# Patient Record
Sex: MALE | Race: WHITE | Employment: OTHER | URBAN - METROPOLITAN AREA
[De-identification: names, ages, dates, MRNs, and addresses within clinical notes are randomized per-mention and may not be internally consistent; named-entity substitution may affect disease eponyms.]

---

## 2019-09-17 ENCOUNTER — OFFICE VISIT (OUTPATIENT)
Dept: INTERNAL MEDICINE | Facility: CLINIC | Age: 34
End: 2019-09-17

## 2019-09-17 VITALS
DIASTOLIC BLOOD PRESSURE: 77 MMHG | SYSTOLIC BLOOD PRESSURE: 102 MMHG | BODY MASS INDEX: 24.91 KG/M2 | OXYGEN SATURATION: 94 % | TEMPERATURE: 98.2 F | WEIGHT: 194.1 LBS | HEART RATE: 98 BPM | HEIGHT: 74 IN

## 2019-09-17 DIAGNOSIS — M25.562 PAIN IN BOTH KNEES, UNSPECIFIED CHRONICITY: Primary | ICD-10-CM

## 2019-09-17 DIAGNOSIS — M25.50 MULTIPLE JOINT PAIN: ICD-10-CM

## 2019-09-17 DIAGNOSIS — M25.561 PAIN IN BOTH KNEES, UNSPECIFIED CHRONICITY: Primary | ICD-10-CM

## 2019-09-17 DIAGNOSIS — M25.552 LATERAL PAIN OF LEFT HIP: ICD-10-CM

## 2019-09-17 LAB
CRP SERPL-MCNC: 4.7 MG/L (ref 0–8)
ERYTHROCYTE [SEDIMENTATION RATE] IN BLOOD BY WESTERGREN METHOD: 4 MM/H (ref 0–15)

## 2019-09-17 PROCEDURE — 99000 SPECIMEN HANDLING OFFICE-LAB: CPT | Performed by: INTERNAL MEDICINE

## 2019-09-17 PROCEDURE — 86618 LYME DISEASE ANTIBODY: CPT | Performed by: INTERNAL MEDICINE

## 2019-09-17 PROCEDURE — 85652 RBC SED RATE AUTOMATED: CPT | Performed by: INTERNAL MEDICINE

## 2019-09-17 PROCEDURE — 86431 RHEUMATOID FACTOR QUANT: CPT | Performed by: INTERNAL MEDICINE

## 2019-09-17 PROCEDURE — 99214 OFFICE O/P EST MOD 30 MIN: CPT | Performed by: INTERNAL MEDICINE

## 2019-09-17 PROCEDURE — 36415 COLL VENOUS BLD VENIPUNCTURE: CPT | Performed by: INTERNAL MEDICINE

## 2019-09-17 PROCEDURE — 86747 PARVOVIRUS ANTIBODY: CPT | Mod: 90 | Performed by: INTERNAL MEDICINE

## 2019-09-17 PROCEDURE — 86140 C-REACTIVE PROTEIN: CPT | Performed by: INTERNAL MEDICINE

## 2019-09-17 ASSESSMENT — MIFFLIN-ST. JEOR: SCORE: 1884.15

## 2019-09-17 NOTE — LETTER
September 23, 2019      Alf Dugan  [address] [facility] [address]        Dear ,    The sed rate and CRP your markers of inflammation in the or in the normal range.  Lyme disease test was negative.  The rheumatoid arthritis test is negative.  Parvovirus shows that you have had this infection in the past which is very common but it is not been any recent infection s your symptoms are most likely related to osteoarthritis.  If there are significant changes let me know.  You may try glucosamine/chondroitin which is a supplement that might help preserve and protect the cartilage in your joints.  This is available over-the-counter and is very safe.    Resulted Orders   Rheumatoid factor   Result Value Ref Range    Rheumatoid Factor <20 <20 IU/mL   ESR: Erythrocyte sedimentation rate   Result Value Ref Range    Sed Rate 4 0 - 15 mm/h   CRP, inflammation   Result Value Ref Range    CRP Inflammation 4.7 0.0 - 8.0 mg/L   Lyme Disease Kristen with reflex to WB Serum   Result Value Ref Range    Lyme Disease Antibodies Serum 0.06 0.00 - 0.89      Comment:      Negative, Absence of detectable Borrelia burdorferi antibodies. A negative   result does not exclude the possibility of Borrelia burgdorferi infection. If   early Lyme disease is suspected, a second sample should be collected and   tested 2 to 4 weeks later.     Parvovirus B19 antibodies IgG IgM   Result Value Ref Range    Parvovirus B19 IgG 2.74 (H) <=0.89 IV      Comment:      (Note)  INTERPRETIVE INFORMATION: Parvovirus B19 Antibody, IgG   0.89 IV or less .......... Negative - No significant                              level of detectable Parvovirus                              B19 IgG antibody.   0.90 - 1.10 IV ........... Equivocal - Repeat testing in                              10-14 days may be helpful.   1.11 IV or greater ....... Positive - IgG antibody to                              Parvovirus B19 detected which                               may indicate a current or                              past infection.  The best evidence for current infection is a significant   change on two appropriately timed specimens, where both   tests are done in the same laboratory at the same time.      Parvovirus B19 IgM 0.09 <=0.89 IV      Comment:      (Note)  INTERPRETIVE INFORMATION: Parvovirus B19 Antibody, IgM   0.89 IV or less .......... Negative - No significant                              level of detectable Parvovirus                              B19 IgM antibody.   0.90 - 1.10 IV ........... Equivocal - Repeat testing in                              10-14 days may be helpful.   1.11 IV or greater ........ Positive - IgM antibody to                              Parvovirus B19 detected which                              may indicate a current or                              recent infection. However, low                              levels of IgM antibodies may                              occasionally persist for more                              than 12 months post-infection.  The best evidence for current infection is a significant   change on two appropriately timed specimens, where both   tests are done in the same laboratory at the same time.  Appearance of an IgM antibody response normally occurs 7 to   14 days after t  he onset of disease. Testing immediately   post-exposure is of no value without a later convalescent   specimen. A residual IgM response may be distinguished from   early IgM response to infection by testing sera from   patients three to four weeks later for changing levels of   specific IgM antibodies.  Performed by [facility],  [address] [place] [id number] [phone]  [email or web address], Maxime Nicole MD, Lab. Director         If you have any questions or concerns, please call the clinic at the number listed above.       Sincerely,        Rose Littlejohn MD

## 2019-09-17 NOTE — PROGRESS NOTES
Subjective     Alf Dugan is a 34 year old male who presents to clinic today for the following health issues:    HPI     Multiple joint pains:  He reports he has had trouble over 5 months that seem to start first in the hands, then a month later started to bother his knees, month after that his hips.  He states that his wife had noticed that he seemed to be more angry and he felt that was due to pain so decided to come in.    Hand joints: He reports that he has minimal pain in the morning but then it does increase with use, most days the pain starts at 1/10, goes up to 3/10 but some days are 5/10.  The pain is in the MCPs and PIP joints.he feels like there is pressure or like they are swollen though they do not look obviously swollen to the eye.  They hurt more with more use, they feel very stiff in the stiffness comes on through the day without significant morning stiffness.  They bother more with cold weather, humidity weather.  It was bilateral equal the first few months, now it can vary but currently the left MCPs are the most sore.   He does not have any pain in the wrists or elbows.  He has had some issues with the left shoulder on and off in the past.    Knee joints: They have not been swollen, it is primarily the medial joints that hurt, stairs will bother going up more than going down, that they do pop occasionally with no locking.  They can cause him to limp when they are bothering him.  They are about bilaterally equal.  They tend to be very stiff all the time.    Hips: This is more lateral hips, occasionally in the groin.  It is bothering mostly on the left.  It seems okay in the morning and then hurts more as he is more active and later in the day.  He can affect the walking.  It can bother him with climbing a lot of stairs.  He has some occasional low back pain in the center with some stiffness but no true radiation into the legs below the knees, no buttock pain, no numbness or tingling in the  "legs.    He rarely takes anything for his symptoms.    Patient Active Problem List   Diagnosis     CARDIOVASCULAR SCREENING; LDL GOAL LESS THAN 160     No current outpatient medications on file.          Reviewed and updated as needed this visit by Provider         Review of Systems   No fever, chills, rashes, joint swellings, tick bites, numbness, tingling in the arms or legs      Objective    /77 (BP Location: Left arm, Patient Position: Sitting, Cuff Size: Adult Large)   Pulse 98   Temp 98.2  F (36.8  C) (Oral)   Ht 1.886 m (6' 2.25\")   Wt 88 kg (194 lb 1.6 oz)   SpO2 94%   BMI 24.75 kg/m    Body mass index is 24.75 kg/m .  Physical Exam     Hands: There is some tenderness with some of the MCP joints, particularly left third, PIP joints without significant tenderness, no synovitis or effusions  Wrist joints nontender, good range of motion without pain, no effusions, synovitis  Elbows are nontender, no effusion, synovitis  Knee joints are moderately tender at the medial joints, no effusions, no crepitus, full range of motion  There is moderate tenderness of the left lateral hip area, mild tenderness in the right, joints are without tenderness, good range of motion without pain,  Mild tenderness of the left buttock  Mild tenderness of the lower lumbar back, good range of motion          Assessment & Plan     1. Pain in both knees, unspecified chronicity  Advised that this may be more of the PDX  - ORTHO  REFERRAL    2. Lateral pain of left hip  This may be related to the knees, probably some bursitis, refer to orthopedics  - ORTHO  REFERRAL    3. Multiple joint pain  Suspect the hand joints are more of an osteoarthritis problem, check some labs to look for systemic issues, advised on hand care, over-the-counter treatment  - Rheumatoid factor  - ESR: Erythrocyte sedimentation rate  - CRP, inflammation  - Lyme Disease Kristen with reflex to WB Serum  - Parvovirus B19 antibodies IgG IgM   "   Tobacco Cessation:   reports that he has been smoking cigarettes. He has a 3.00 pack-year smoking history. He has never used smokeless tobacco.  Tobacco Cessation Action Plan: Information offered: Patient not interested at this time            Return in about 1 year (around 9/17/2020) for Physical Exam.    Rose Littlejohn MD  [facility]

## 2019-09-18 LAB
B BURGDOR IGG+IGM SER QL: 0.06 (ref 0–0.89)
RHEUMATOID FACT SER NEPH-ACNC: <20 IU/ML (ref 0–20)

## 2019-09-19 LAB
B19V IGG SER IA-ACNC: 2.74 IV
B19V IGM SER IA-ACNC: 0.09 IV

## 2019-10-02 ENCOUNTER — OFFICE VISIT (OUTPATIENT)
Dept: ORTHOPEDICS | Facility: CLINIC | Age: 34
End: 2019-10-02

## 2019-10-02 VITALS
WEIGHT: 194 LBS | DIASTOLIC BLOOD PRESSURE: 62 MMHG | SYSTOLIC BLOOD PRESSURE: 124 MMHG | HEIGHT: 74 IN | BODY MASS INDEX: 24.9 KG/M2

## 2019-10-02 DIAGNOSIS — M22.2X2 PATELLOFEMORAL SYNDROME OF BOTH KNEES: Primary | ICD-10-CM

## 2019-10-02 DIAGNOSIS — M22.2X1 PATELLOFEMORAL SYNDROME OF BOTH KNEES: Primary | ICD-10-CM

## 2019-10-02 PROCEDURE — 99203 OFFICE O/P NEW LOW 30 MIN: CPT | Performed by: FAMILY MEDICINE

## 2019-10-02 RX ORDER — MELOXICAM 15 MG/1
15 TABLET ORAL DAILY
Qty: 30 TABLET | Refills: 2 | Status: SHIPPED | OUTPATIENT
Start: 2019-10-02 | End: 2023-03-22

## 2019-10-02 ASSESSMENT — MIFFLIN-ST. JEOR: SCORE: 1883.7

## 2019-10-02 NOTE — PATIENT INSTRUCTIONS
1. Patellofemoral syndrome of both knees      -Patient has bilateral chronic knee pain due to patellofemoral syndrome.  -Patient will start meloxicam 15 mg daily as needed  -Patient will start home exercise program which was given to the patient today  -Patient may continue to work and perform all activities as tolerated.  -If pain does not improve, to consider a follow-up appointment for a possible cortisone injection.  -Patient may purchase over-the-counter patellofemoral stabilizing braces  -Call direct clinic number [[phone]] at any time with questions or concerns.    Albert Yeo MD CAQSM  [facility]  [facility] [place]
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
## 2019-10-04 ENCOUNTER — TELEPHONE (OUTPATIENT)
Dept: ORTHOPEDICS | Facility: CLINIC | Age: 34
End: 2019-10-04

## 2019-10-04 NOTE — TELEPHONE ENCOUNTER
Alf Dugan is a 34 year old male who left voicemail yesterday stating he saw Dr. Yeo on 10/2/19 and was prescribed Meloxicam 15mg. When he went to the pharmacy to pick it up, they did not have a prescription.     Patient expecting call back: YES      Preferred contact number:  [phone] (home) [phone] (work)   Can we leave a detailed message on this number: NO consent on file    Per chart review prescription was sent on 10/2/19 at 4:50 pm and receipt was confirmed by pharmacy in [facility].     Phone call to [facility] and they did receive prescription. However, they need insurance information from patient. He states it is not a matter of it being denied by insurance, but that they need patients insurance information in or to process it.     Phone call to patient. He states he checked at the pharmacy yesterday afternoon and was told they did not have it. Informed of the above and asked that he contact pharmacy. Asked that he call back if any problems. He verbalized understanding.     LAURENT Yoder RN

## 2021-11-23 ENCOUNTER — E-VISIT (OUTPATIENT)
Dept: FAMILY MEDICINE | Facility: CLINIC | Age: 36
End: 2021-11-23
Payer: COMMERCIAL

## 2021-11-23 ENCOUNTER — LAB (OUTPATIENT)
Dept: LAB | Facility: CLINIC | Age: 36
End: 2021-11-23
Attending: PHYSICIAN ASSISTANT

## 2021-11-23 DIAGNOSIS — Z20.822 CLOSE EXPOSURE TO 2019 NOVEL CORONAVIRUS: ICD-10-CM

## 2021-11-23 DIAGNOSIS — Z20.822 CLOSE EXPOSURE TO 2019 NOVEL CORONAVIRUS: Primary | ICD-10-CM

## 2021-11-23 LAB — SARS-COV-2 RNA RESP QL NAA+PROBE: NEGATIVE

## 2021-11-23 PROCEDURE — 99421 OL DIG E/M SVC 5-10 MIN: CPT | Performed by: PHYSICIAN ASSISTANT

## 2021-11-23 PROCEDURE — U0003 INFECTIOUS AGENT DETECTION BY NUCLEIC ACID (DNA OR RNA); SEVERE ACUTE RESPIRATORY SYNDROME CORONAVIRUS 2 (SARS-COV-2) (CORONAVIRUS DISEASE [COVID-19]), AMPLIFIED PROBE TECHNIQUE, MAKING USE OF HIGH THROUGHPUT TECHNOLOGIES AS DESCRIBED BY CMS-2020-01-R: HCPCS

## 2021-11-23 PROCEDURE — U0005 INFEC AGEN DETEC AMPLI PROBE: HCPCS

## 2021-11-23 NOTE — PATIENT INSTRUCTIONS
"  Dear Alf Dugan,    Based on your exposure to COVID-19 (coronavirus), we would like to test you for this virus. I have placed an order for this test. The best time for testing is 5-7 days after the exposure.    How to schedule:  For all employees or close contacts (except [facility] and [employer] - see below), go to your [employer] home page and scroll down to the section that says  You have an appointment that needs to be scheduled  and click the large green button that says  Schedule Now  and follow the steps to find the next available opening.     If you are unable to complete these steps or if you cannot find any available times, please call [phone] to schedule employee testing.     [facility] employees or close contacts, please call [phone].   [facility]) employees or close contacts call [phone].    Return to work/school/ guidance:   For people with high risk exposures outside the home    Please let your workplace manager and staffing office know when your quarantine ends.     We can not give you an exact date as it depends on the information below. You can calculate this on your own or work with your manager/staffing office to calculate this. (For example if you were exposed on 10/4, you would have to quarantine for 14 full days. That would be through 10/18. You could return on 10/19.)    Quarantine Guidelines:  Patients (\"contacts\") who have been in close prolonged contact of an infected person(s) (within six feet for at least 15 minutes within a 24 hour period), and remain asymptomatic should enter quarantine based on the following options:    14-day quarantine period (this remains the CDC recommendation for the greatest protection against spread of COVID-19) OR    Minimum 7-day quarantine with negative RT-PCR test collected on day 5 or later OR    10-day quarantine with no test  Quarantine Guideline exceptions are as follows:    People who have been fully vaccinated do not need to " quarantine if the exposure was at least 2 weeks after the last vaccination. This includes vaccinated health care workers.    Not fully vaccinated and unvaccinated Individuals who work in health care, congregate care, or congregate living should be off work for 14 days from their last date of exposure. Community activities for this group can be resumed based on options above. Fully vaccinated individuals in this group do not need to quarantine from work after exposure.    Not fully vaccinated and unvaccinated people whose high-risk exposure was a household member should always quarantine for 14 days from their last date of exposure. Fully vaccinated people in this category do not need to quarantine.    Not fully vaccinated or unvaccinated residents of congregate care and congregate living settings should always quarantine for 14 days from their last date of exposure. Fully vaccinated residents do not need to quarantine.    Note: If you have ongoing exposure to the covid positive person, this quarantine period may be more than 14 days. (For example, if you are continued to be exposed to your child who tested positive and cannot isolate from them, then the quarantine of 7-14 days can't start until your child is no longer contagious. This is typically 10 days from onset of the child's symptoms. So the total duration may be 17-24 days in this case.)    You should continue symptom monitoring until day 14 post-exposure. If you develop signs or symptoms of COVID-19, isolate and get tested (even if you have been tested already).    How to quarantine:   Stay home and away from others. Don't go to school or anywhere else. Generally quarantine means staying home from work but there are some exceptions to this. Please contact your workplace.  No hugging, kissing or shaking hands.  Don't let anyone visit.  Cover your mouth and nose with a mask, tissue or washcloth to avoid spreading germs.  Wash your hands and face often. Use  soap and water.    What are the symptoms of COVID-19?  The most common symptoms are cough, fever and trouble breathing. Less common symptoms include headache, body aches, fatigue (feeling very tired), chills, sore throat, stuffy or runny nose, diarrhea (loose poop), loss of taste or smell, belly pain, and nausea or vomiting (feeling sick to your stomach or throwing up).  After 14 days, if you have still don't have symptoms, you likely don't have this virus.  If you develop symptoms, follow these guidelines.  If you're normally healthy: Please start another eVisit.  If you have a serious health problem (like cancer, heart failure, an organ transplant or kidney disease): Call your specialty clinic. Let them know that you might have COVID-19.    Where can I get more information?   [employer] - About COVID-19: [email or web address]  CDC - What to Do If You're Sick: www.cdc.gov/coronavirus/2019-ncov/about/steps-when-sick.html  CDC - Ending Home Isolation: www.cdc.gov/coronavirus/2019-ncov/hcp/disposition-in-home-patients.html  CDC - Caring for Someone: www.cdc.gov/coronavirus/2019-ncov/if-you-are-sick/care-for-someone.html  [facility] clinical trials (COVID-19 research studies): clinicalaffairs.[facility]-clinical-trials  Below are the COVID-19 hotlines at the [facility] ([facility]). Interpreters are available.  For health questions: Call 736-007-8664 or 1-920.394.6250 (7 a.m. to 7 p.m.)  For questions about schools and childcare: Call 802-888-4969 or 1-442.694.8831 (7 a.m. to 7 p.m.)      November 23, 2021  RE:  Alf BARRAGAN Kailee                                                                                                                   [address] [facility] [address]      To whom it may concern:    I evaluated Alf Dugan on November 23, 2021. Alf Dugan should be excused from work/school.    They should let their workplace manager and staffing office  "know when their quarantine ends.    We can not give an exact date as it depends on the information below. They can calculate this on their own or work with their manager/staffing office to calculate this. (For example if they were exposed on 10/04, they would have to quarantine for 14 full days. That would be through 10/18. They could return on 10/19.)    Quarantine Guidelines:    Patients (\"contacts\") who have been in close prolonged contact of an infected person(s) (within six feet for at least 15 minutes within a 24 hour period) and remain asymptomatic should enter quarantine based on the following options:      14-day quarantine period (this remains the CDC recommendation for the greatest protection against spread of COVID-19) OR    Minimum 7-day quarantine with negative RT-PCR test collected on day 5 or later OR    10-day quarantine with no test   Quarantine Guideline exceptions are as follows:    People who have been fully vaccinated do not need to quarantine if the exposure was at least 2 weeks after the last vaccination. This includes vaccinated health care workers.    Not fully vaccinated and unvaccinated Individuals who work in health care, congregate care, or congregate living should be off work for 14 days from their last date of exposure. Community activities for this group can be resumed based on options above. Fully vaccinated individuals in this group do not need to quarantine from work after exposure.    Not fully vaccinated and unvaccinated people whose high-risk exposure was a household member should always quarantine for 14 days from their last date of exposure. Fully vaccinated people in this category do not need to quarantine.    Not fully vaccinated or unvaccinated residents of congregate care and congregate living settings should always quarantine for 14 days from their last date of exposure. Fully vaccinated residents do not need to quarantine.    Note: If there is ongoing exposure to the " covid positive person, this quarantine period may be longer than 14 days. (For example, if they are continually exposed to their child, who tested positive and cannot isolate from them, then the quarantine of 7-14 days can't start until their child is no longer contagious. This is typically 10 days from onset to the child's symptoms. So the total duration may be 17-24 days in this case.)    Alf YUNG Robertsle should continue symptom monitoring until day 14 post-exposure. If they develop signs or symptoms of COVID-19, they should isolate and get tested (even if they have been tested already).    Sincerely,  Nuzhat Cali PA-C

## 2021-12-18 ENCOUNTER — HEALTH MAINTENANCE LETTER (OUTPATIENT)
Age: 36
End: 2021-12-18

## 2022-02-01 ENCOUNTER — IMMUNIZATION (OUTPATIENT)
Dept: NURSING | Facility: CLINIC | Age: 37
End: 2022-02-01
Payer: COMMERCIAL

## 2022-02-01 PROCEDURE — 91306 COVID-19,PF,MODERNA (18+ YRS BOOSTER .25ML): CPT

## 2022-02-01 PROCEDURE — 0064A COVID-19,PF,MODERNA (18+ YRS BOOSTER .25ML): CPT

## 2022-02-17 ENCOUNTER — OFFICE VISIT (OUTPATIENT)
Dept: INTERNAL MEDICINE | Facility: CLINIC | Age: 37
End: 2022-02-17
Payer: COMMERCIAL

## 2022-02-17 VITALS
OXYGEN SATURATION: 98 % | WEIGHT: 159.6 LBS | BODY MASS INDEX: 20.35 KG/M2 | HEART RATE: 72 BPM | TEMPERATURE: 97.8 F | DIASTOLIC BLOOD PRESSURE: 78 MMHG | SYSTOLIC BLOOD PRESSURE: 117 MMHG | RESPIRATION RATE: 18 BRPM

## 2022-02-17 DIAGNOSIS — L84 CALLUS OF FOOT: Primary | ICD-10-CM

## 2022-02-17 DIAGNOSIS — R20.0 HAND NUMBNESS: ICD-10-CM

## 2022-02-17 PROCEDURE — 99213 OFFICE O/P EST LOW 20 MIN: CPT | Performed by: INTERNAL MEDICINE

## 2022-02-17 RX ORDER — PHENOL 1.4 %
10 AEROSOL, SPRAY (ML) MUCOUS MEMBRANE
COMMUNITY

## 2022-02-17 ASSESSMENT — PATIENT HEALTH QUESTIONNAIRE - PHQ9
SUM OF ALL RESPONSES TO PHQ QUESTIONS 1-9: 9
SUM OF ALL RESPONSES TO PHQ QUESTIONS 1-9: 9
10. IF YOU CHECKED OFF ANY PROBLEMS, HOW DIFFICULT HAVE THESE PROBLEMS MADE IT FOR YOU TO DO YOUR WORK, TAKE CARE OF THINGS AT HOME, OR GET ALONG WITH OTHER PEOPLE: NOT DIFFICULT AT ALL

## 2022-02-17 NOTE — NURSING NOTE
/78 (BP Location: Right arm, Patient Position: Sitting, Cuff Size: Adult Large)   Pulse 72   Temp 97.8  F (36.6  C) (Oral)   Resp 18   Wt 72.4 kg (159 lb 9.6 oz)   SpO2 98%   BMI 20.35 kg/m

## 2022-02-17 NOTE — PROGRESS NOTES
Assessment & Plan     Callus of foot  Advised that the callusing is related to more pressure on the lateral foot.  Suggest he get insoles that will help some excess supination.  Discussed use of over-the-counter products, pumice to keep the callusing reduced.  Could use bunion pad at the lateral MTPs though there is not really significant deformity at this point.  If he is trying this thinks and it is not improved, could refer to podiatry to consider fitted insoles.    Hand numbness  Advised that this may not be related to the shoulder though cannot completely rule that out.  It is possible it is carpal tunnel, advised about the location of the changes related to brachial plexus versus median nerve.  If he can tell that it seems to be carpal tunnel, he can wear splints.  If progressive, could consider referral.        No follow-ups on file.    Rose Littlejohn MD  [facility] PATSY Milner is a 36 year old who presents for the following health issues     1.  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  He has developed callusing and pain at the lateral feet.  This has become gradually thicker, especially near the fifth MTP.  This is causing some pain, especially by the end of the day.    2.  Left hand numbness: He had previously dislocated the left shoulder, he feels some soreness there occasionally.  Now he is noticing that he will get some occasional numbness in his left hand.  This occurs about once a week, feels like the entire hand, not sure if it is lateral or medial.      History of Present Illness     Reason for visit:  Foot problems  Symptom onset:  3-4 weeks ago  Symptoms include:  Callous buildup on inside edges of feet and possible bunions  Symptom intensity:  Moderate  Symptom progression:  Worsening  Had these symptoms before:  No    He eats 2-3 servings of fruits and vegetables daily.He consumes 6 sweetened beverage(s) daily.He  exercises with enough effort to increase his heart rate 60 or more minutes per day.  He exercises with enough effort to increase his heart rate 6 days per week.   He is taking medications regularly.       Patient Active Problem List   Diagnosis     CARDIOVASCULAR SCREENING; LDL GOAL LESS THAN 160     Current Outpatient Medications   Medication Sig Dispense Refill     Melatonin 10 MG TABS tablet Take 10 mg by mouth nightly as needed for sleep       meloxicam (MOBIC) 15 MG tablet Take 1 tablet (15 mg) by mouth daily 30 tablet 2      Review of Systems   negative      Objective    /78 (BP Location: Right arm, Patient Position: Sitting, Cuff Size: Adult Large)   Pulse 72   Temp 97.8  F (36.6  C) (Oral)   Resp 18   Wt 72.4 kg (159 lb 9.6 oz)   SpO2 98%   BMI 20.35 kg/m    Body mass index is 20.35 kg/m .  Physical Exam     Left hand: Currently there are no paresthesias or dysesthesias.  Negative Tinel's at the wrist    Feet: There is some thickened callus lateral feet, especially at the fifth MTPs.  Mild tenderness at that joint.  Not really significant deformity at this point.

## 2022-02-18 ASSESSMENT — PATIENT HEALTH QUESTIONNAIRE - PHQ9: SUM OF ALL RESPONSES TO PHQ QUESTIONS 1-9: 9

## 2022-07-15 ENCOUNTER — OFFICE VISIT (OUTPATIENT)
Dept: PODIATRY | Facility: CLINIC | Age: 37
End: 2022-07-15
Payer: COMMERCIAL

## 2022-07-15 VITALS — WEIGHT: 159 LBS | SYSTOLIC BLOOD PRESSURE: 100 MMHG | BODY MASS INDEX: 20.28 KG/M2 | DIASTOLIC BLOOD PRESSURE: 60 MMHG

## 2022-07-15 DIAGNOSIS — Q66.70 PES CAVUS: ICD-10-CM

## 2022-07-15 DIAGNOSIS — B35.1 ONYCHOMYCOSIS OF TOENAIL: ICD-10-CM

## 2022-07-15 DIAGNOSIS — M20.32 HALLUX HAMMERTOE, LEFT: ICD-10-CM

## 2022-07-15 DIAGNOSIS — M20.31 HALLUX HAMMERTOE, RIGHT: ICD-10-CM

## 2022-07-15 DIAGNOSIS — L84 CALLUS: Primary | ICD-10-CM

## 2022-07-15 PROCEDURE — 99203 OFFICE O/P NEW LOW 30 MIN: CPT | Performed by: PODIATRIST

## 2022-07-15 RX ORDER — CICLOPIROX OLAMINE 7.7 MG/G
CREAM TOPICAL DAILY
Qty: 30 G | Refills: 6 | Status: SHIPPED | OUTPATIENT
Start: 2022-07-15 | End: 2023-03-22

## 2022-07-15 NOTE — PATIENT INSTRUCTIONS
Thank you for choosing [facility] Podiat[facility] / Foot & Ankle Surgery!    DR IBRAHIM'S CLINIC:  [facility]   [address]   [place] [address]      TRIAGE LINE: [phone]  APPOINTMENTS: [phone]  RADIOLOGY: [phone]  SET UP SURGERY: [phone]  FAX NUMBER: [phone]  BILLING QUESTIONS: [phone]       Follow up: As needed      NAIL FUNGUS / ONYCHOMYCOSIS   Nail fungus is not a hygiene problem and will likely not lead to significant medical problems. The nails may get thick causing pain and possibly local skin infection. Treatments include debridement (trimming), oral antifungals, topical antifungals and complete removal of the nail. Most fungal nails are not treated.     Topicals such as tea tree oil can be helpful for surface fungus and may, at best, limit progression. Over the counter creams (such as Lamisil) can also be used however, their effectiveness is also quite low.  Topical treatment with Pen lac is expensive and often not covered by insurance. Pen lac has an approximate 8% success rate. Topical therapy recommendations is to apply twice a day for at least 3-4 months as it takes 9 months for new nail to grow out.     Experts suggest soaking your feet for 15 to 20 minutes in a mixture of 1 cup vinegar to 4 cups warm water. Be sure to rinse well and pat your feet dry when you're done. You can soak your feet like this daily. But if your skin becomes irritated, try soaking only two to three times a week. Vicks VapoRub, as with vinegar, there have been no controlled clinical trials to assess the effectiveness of Vicks VapoRub on nail fungus, but there have been numerous anecdotal reports that it works. There's no consensus on how often to apply this product, so check with your doctor before using it on your nails.      Oral therapies include Sporanox and Lamisil. Oral therapies are also expensive and not very effective. Side effects such as liver disease are the  main concern. Return of fungus is common even if the treatment worked.      Other Tips:  - Penlac nail medication apply daily x 4 months; remove old polish first day of each week  - Antifungal cream/powder (Zeasorb) - apply daily to feet and shoes x 2 months  - Clean shoes with Lysol or in washing machine every few weeks  - Rotate shoe gear; give them 24 hours to dry out between days wearing them  - Clean pair of socks in morning, clean pair in afternoon if your feet sweat  - Shower shoes used in public showers/pools          CALLUS / CORNS / IPKs  When there is excessive friction or pressure on the skin, the body responds by making the skin thicker to protect the deeper structures from becoming exposed. While this works well to protect the deeper structures, the thickened skin can increase pressure and pain.    CALLUS: Flat, diffuse thickening are simple calluses and they are usually caused by friction. Often these are the result of rubbing on a shoe or going barefoot.    CORNS: Calluses with a central core between the toes are called corns. These result from prominent joints on adjacent toes rubbing together. Theses are a symptom of bone malalignment and will always recur unless the underlying bones are addressed surgically.    IPKs: Calluses with a central core on the ball of the foot are usually IPKs (intractable plantar keratosis). These are caused by excessive pressure from the metatarsals, the bones that make up the ball of the foot. Often one of these bones is too long or too prominent.  Again, these will always recur unless the underlying bone issue is addressed. There is no cure for these. They will either go away by themselves, recur, or more could develop.    ROUTINE MAINTENANCE  1. File them down with a pumice stone or callus file a couple times a week.   2. An electric callus removing device. Amope Pedi Perfect Electronic Pedicure Foot File and Callus Remover can be a good option.   3. Lotion can be  applied to soften the callus. A urea based cream such as Kersal or Vanicream or thicker cream with shea butter are good options.  4. Toe spacers or toe covers can be used for corns, gel pads can be used for other lesions on the bottom of the foot.   If there is a surgical pathology noted, such as a prominent bone, often this needs to be addressed surgically to minimize recurrence. However, sometimes the lesion simply migrates to another spot after surgery, so it is not a guaranteed cure.     **If you come back to clinic for treatment, insurance does not cover it, and you would be billed. This charge could range from $100 - $227**     [email or web address] or call 8-999-PEDWhereNet  TOE [employer]OVERS/CAPS

## 2022-07-15 NOTE — PROGRESS NOTES
PATIENT HISTORY:    Alf Dugan is a 37 year old male who presents to clinic for issues with his feet.  He notes that he develops calluses and wondering if there is any way to get rid of them.  They do go numb at times if he is in his shoes for long time.  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  He also notes that his nails are darkened and he is wondering what can be done for nail fungus.  Usually does not have pain to the feet.  Denies injury.  Denies fever, nausea, vomiting.    Review of Systems:  Patient denies fever, chills, rash, wound, stiffness, limping, numbness, weakness, heart burn, blood in stool, chest pain with activity, calf pain when walking, shortness of breath with activity, chronic cough, easy bleeding/bruising, swelling of ankles, excessive thirst, fatigue, depression, anxiety.      PAST MEDICAL HISTORY:   Past Medical History:   Diagnosis Date     Lung injury      Mild intermittent asthma     sports induced in past     Other chronic allergic conjunctivitis     Allergic conjunctivitis to cats        PAST SURGICAL HISTORY:   Past Surgical History:   Procedure Laterality Date     GENITOURINARY SURGERY      vasectomy     NO HISTORY OF SURGERY          MEDICATIONS:   Current Outpatient Medications:      Melatonin 10 MG TABS tablet, Take 10 mg by mouth nightly as needed for sleep, Disp: , Rfl:      meloxicam (MOBIC) 15 MG tablet, Take 1 tablet (15 mg) by mouth daily, Disp: 30 tablet, Rfl: 2     ALLERGIES:  No Known Allergies     SOCIAL HISTORY:   Social History     Socioeconomic History     Marital status:      Spouse name: Not on file     Number of children: Not on file     Years of education: Not on file     Highest education level: Not on file   Occupational History     Not on file   Tobacco Use     Smoking status: Current Every Day Smoker     Packs/day: 0.50     Years: 6.00     Pack years: 3.00     Types: Cigarettes     Smokeless tobacco: Never Used     Tobacco  comment: 1/4 pack daily   Substance and Sexual Activity     Alcohol use: Yes     Alcohol/week: 1.7 standard drinks     Comment: social     Drug use: No     Types: Marijuana     Comment: stopped 2 months ago     Sexual activity: Yes     Partners: Female     Birth control/protection: Pill   Other Topics Concern     Parent/sibling w/ CABG, MI or angioplasty before 65F 55M? Not Asked   Social History Narrative     Not on file     Social Determinants of Health     Financial Resource Strain: Not on file   Food Insecurity: Not on file   Transportation Needs: Not on file   Physical Activity: Not on file   Stress: Not on file   Social Connections: Not on file   Intimate Partner Violence: Not on file   Housing Stability: Not on file        FAMILY HISTORY:   Family History   Problem Relation Age of Onset     Diabetes Mother      Alcohol/Drug Father      Diabetes Father         EXAM:Vitals: /60   Wt 72.1 kg (159 lb)   BMI 20.28 kg/m    BMI= Body mass index is 20.28 kg/m .    General appearance: Patient is alert and fully cooperative with history & exam.  No sign of distress is noted during the visit.     Psychiatric: Affect is pleasant & appropriate.  Patient appears motivated to improve health.     Respiratory: Breathing is regular & unlabored while sitting.     HEENT: Hearing is intact to spoken word.  Speech is clear.  No gross evidence of visual impairment that would impact ambulation.     Dermatologic: Localized hyperkeratotic lesions to the plantar medial aspects of both IPJ's.  No open lesions or signs of infection noted.  Minimal discoloration to the ends of both great toenails with some lysis of the nail.     Vascular: DP & PT pulses are intact & regular bilaterally.  No significant edema or varicosities noted.  CFT and skin temperature is normal to both lower extremities.     Neurologic: Lower extremity sensation is intact to light touch.  No evidence of weakness or contracture in the lower extremities.  No  evidence of neuropathy.     Musculoskeletal: Patient is ambulatory without assistive device or brace.  Increased arch height bilaterally.  Semiflexible contracture of both great toes.     ASSESSMENT:    Callus  Hallux hammertoe, left  Hallux hammertoe, right  Pes cavus  Onychomycosis of toenail     Medical Decision Making/Plan:  Reviewed patient's chart in epic. Discussed causes of keratomas.  They are due to areas of increase friction.  Hammertoes can create these as they put more pressure to the metatarsal head.  Discussed treatments such as using foot file, pumice stone, metatarsal pads, orthotics, and not walking barefoot.     We discussed the cost structure of callus care if they were to come back and have it treated in the clinic if insurance does not cover it and explained that they would be billed. They were also provided information on places to get the callus treatment.    They are going to use a pumice stone at this time 2-3 times a week.  We also talked about motioning the feet daily.  Talked about toe covers to help give him some cushion to the area to try to help the friction and buildup of the calluses in his steel toed boots.    Discussed causes and treatments of nail fungus.  Explained that even if a culture comes back negative, a patient could still have nail fungus.  Discussed treatment options with patient and explained that there isn't one treatment that is 100% effective.  Discussed oral lamisil which is the most effective at about 70% but which can have liver effects.  Explained that if she wanted to try this that she would need serial blood draws to test her liver function.  Discussed over the counter antifungal creams.  Explained that these are about 50% effective and need to be applied once a day for about 6-8months.  Also talked about prescription penlac which is a nail laquer.  Again this is also only 50% effective.  Also discussed that if there was damage to the nail and the nail is now  dystrophic that non of the above is going to change the nail.  If there was damage, there is note anything that can be done for the nail to correct it.  Discussed that if it becomes painful, we can remove the nail in clinic.        Patient was given an order for an antifungal cream.  He will apply this daily for the next 6 to 12 months.  All questions were answered to patient satisfaction and he will call for the questions or concerns.      Patient risk factor: Patient is at low risk for infection.        Ernestina Mitchell DPM, Podiatry/Foot and Ankle Surgery

## 2022-07-15 NOTE — LETTER
7/15/2022         RE: Alf Dugan  [address] [address] [facility] [address]        Dear Colleague,    Thank you for referring your patient, Alf Dugan, to the [facility]. Please see a copy of my visit note below.    PATIENT HISTORY:    Alf Dugan is a 37 year old male who presents to clinic for issues with his feet.  He notes that he develops calluses and wondering if there is any way to get rid of them.  They do go numb at times if he is in his shoes for long time.  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  He also notes that his nails are darkened and he is wondering what can be done for nail fungus.  Usually does not have pain to the feet.  Denies injury.  Denies fever, nausea, vomiting.    Review of Systems:  Patient denies fever, chills, rash, wound, stiffness, limping, numbness, weakness, heart burn, blood in stool, chest pain with activity, calf pain when walking, shortness of breath with activity, chronic cough, easy bleeding/bruising, swelling of ankles, excessive thirst, fatigue, depression, anxiety.      PAST MEDICAL HISTORY:   Past Medical History:   Diagnosis Date     Lung injury      Mild intermittent asthma     sports induced in past     Other chronic allergic conjunctivitis     Allergic conjunctivitis to cats        PAST SURGICAL HISTORY:   Past Surgical History:   Procedure Laterality Date     GENITOURINARY SURGERY      vasectomy     NO HISTORY OF SURGERY          MEDICATIONS:   Current Outpatient Medications:      Melatonin 10 MG TABS tablet, Take 10 mg by mouth nightly as needed for sleep, Disp: , Rfl:      meloxicam (MOBIC) 15 MG tablet, Take 1 tablet (15 mg) by mouth daily, Disp: 30 tablet, Rfl: 2     ALLERGIES:  No Known Allergies     SOCIAL HISTORY:   Social History     Socioeconomic History     Marital status:      Spouse name: Not on file     Number of children: Not on file     Years of education: Not on file     Highest  education level: Not on file   Occupational History     Not on file   Tobacco Use     Smoking status: Current Every Day Smoker     Packs/day: 0.50     Years: 6.00     Pack years: 3.00     Types: Cigarettes     Smokeless tobacco: Never Used     Tobacco comment: 1/4 pack daily   Substance and Sexual Activity     Alcohol use: Yes     Alcohol/week: 1.7 standard drinks     Comment: social     Drug use: No     Types: Marijuana     Comment: stopped 2 months ago     Sexual activity: Yes     Partners: Female     Birth control/protection: Pill   Other Topics Concern     Parent/sibling w/ CABG, MI or angioplasty before 65F 55M? Not Asked   Social History Narrative     Not on file     Social Determinants of Health     Financial Resource Strain: Not on file   Food Insecurity: Not on file   Transportation Needs: Not on file   Physical Activity: Not on file   Stress: Not on file   Social Connections: Not on file   Intimate Partner Violence: Not on file   Housing Stability: Not on file        FAMILY HISTORY:   Family History   Problem Relation Age of Onset     Diabetes Mother      Alcohol/Drug Father      Diabetes Father         EXAM:Vitals: /60   Wt 72.1 kg (159 lb)   BMI 20.28 kg/m    BMI= Body mass index is 20.28 kg/m .    General appearance: Patient is alert and fully cooperative with history & exam.  No sign of distress is noted during the visit.     Psychiatric: Affect is pleasant & appropriate.  Patient appears motivated to improve health.     Respiratory: Breathing is regular & unlabored while sitting.     HEENT: Hearing is intact to spoken word.  Speech is clear.  No gross evidence of visual impairment that would impact ambulation.     Dermatologic: Localized hyperkeratotic lesions to the plantar medial aspects of both IPJ's.  No open lesions or signs of infection noted.  Minimal discoloration to the ends of both great toenails with some lysis of the nail.     Vascular: DP & PT pulses are intact & regular  bilaterally.  No significant edema or varicosities noted.  CFT and skin temperature is normal to both lower extremities.     Neurologic: Lower extremity sensation is intact to light touch.  No evidence of weakness or contracture in the lower extremities.  No evidence of neuropathy.     Musculoskeletal: Patient is ambulatory without assistive device or brace.  Increased arch height bilaterally.  Semiflexible contracture of both great toes.     ASSESSMENT:    Callus  Hallux hammertoe, left  Hallux hammertoe, right  Pes cavus  Onychomycosis of toenail     Medical Decision Making/Plan:  Reviewed patient's chart in epic. Discussed causes of keratomas.  They are due to areas of increase friction.  Hammertoes can create these as they put more pressure to the metatarsal head.  Discussed treatments such as using foot file, pumice stone, metatarsal pads, orthotics, and not walking barefoot.     We discussed the cost structure of callus care if they were to come back and have it treated in the clinic if insurance does not cover it and explained that they would be billed. They were also provided information on places to get the callus treatment.    They are going to use a pumice stone at this time 2-3 times a week.  We also talked about motioning the feet daily.  Talked about toe covers to help give him some cushion to the area to try to help the friction and buildup of the calluses in his steel toed boots.    Discussed causes and treatments of nail fungus.  Explained that even if a culture comes back negative, a patient could still have nail fungus.  Discussed treatment options with patient and explained that there isn't one treatment that is 100% effective.  Discussed oral lamisil which is the most effective at about 70% but which can have liver effects.  Explained that if she wanted to try this that she would need serial blood draws to test her liver function.  Discussed over the counter antifungal creams.  Explained that  these are about 50% effective and need to be applied once a day for about 6-8months.  Also talked about prescription penlac which is a nail laquer.  Again this is also only 50% effective.  Also discussed that if there was damage to the nail and the nail is now dystrophic that non of the above is going to change the nail.  If there was damage, there is note anything that can be done for the nail to correct it.  Discussed that if it becomes painful, we can remove the nail in clinic.        Patient was given an order for an antifungal cream.  He will apply this daily for the next 6 to 12 months.  All questions were answered to patient satisfaction and he will call for the questions or concerns.      Patient risk factor: Patient is at low risk for infection.        Ernestina Mitchell DPM, Podiatry/Foot and Ankle Surgery        Again, thank you for allowing me to participate in the care of your patient.        Sincerely,        Ernestina Mitchell DPM, Podiatry/Foot and Ankle Surgery

## 2022-10-09 ENCOUNTER — HEALTH MAINTENANCE LETTER (OUTPATIENT)
Age: 37
End: 2022-10-09

## 2023-02-12 ENCOUNTER — HEALTH MAINTENANCE LETTER (OUTPATIENT)
Age: 38
End: 2023-02-12

## 2023-03-22 ENCOUNTER — ANCILLARY PROCEDURE (OUTPATIENT)
Dept: GENERAL RADIOLOGY | Facility: CLINIC | Age: 38
End: 2023-03-22
Attending: INTERNAL MEDICINE
Payer: COMMERCIAL

## 2023-03-22 ENCOUNTER — OFFICE VISIT (OUTPATIENT)
Dept: INTERNAL MEDICINE | Facility: CLINIC | Age: 38
End: 2023-03-22
Payer: COMMERCIAL

## 2023-03-22 VITALS
HEIGHT: 74 IN | HEART RATE: 74 BPM | BODY MASS INDEX: 19.76 KG/M2 | OXYGEN SATURATION: 99 % | DIASTOLIC BLOOD PRESSURE: 76 MMHG | SYSTOLIC BLOOD PRESSURE: 119 MMHG | RESPIRATION RATE: 18 BRPM | TEMPERATURE: 97.6 F | WEIGHT: 154 LBS

## 2023-03-22 DIAGNOSIS — M25.552 BILATERAL HIP PAIN: ICD-10-CM

## 2023-03-22 DIAGNOSIS — G89.29 CHRONIC PAIN OF BOTH KNEES: Primary | ICD-10-CM

## 2023-03-22 DIAGNOSIS — M25.562 CHRONIC PAIN OF BOTH KNEES: ICD-10-CM

## 2023-03-22 DIAGNOSIS — M25.562 CHRONIC PAIN OF BOTH KNEES: Primary | ICD-10-CM

## 2023-03-22 DIAGNOSIS — M25.561 CHRONIC PAIN OF BOTH KNEES: Primary | ICD-10-CM

## 2023-03-22 DIAGNOSIS — M25.561 CHRONIC PAIN OF BOTH KNEES: ICD-10-CM

## 2023-03-22 DIAGNOSIS — M25.551 BILATERAL HIP PAIN: ICD-10-CM

## 2023-03-22 DIAGNOSIS — G89.29 CHRONIC PAIN OF BOTH KNEES: ICD-10-CM

## 2023-03-22 PROCEDURE — 73522 X-RAY EXAM HIPS BI 3-4 VIEWS: CPT | Mod: TC | Performed by: RADIOLOGY

## 2023-03-22 PROCEDURE — 73560 X-RAY EXAM OF KNEE 1 OR 2: CPT | Mod: TC | Performed by: RADIOLOGY

## 2023-03-22 PROCEDURE — 99214 OFFICE O/P EST MOD 30 MIN: CPT | Performed by: INTERNAL MEDICINE

## 2023-03-22 ASSESSMENT — ENCOUNTER SYMPTOMS
CONSTITUTIONAL NEGATIVE: 1
JOINT SWELLING: 1
ARTHRALGIAS: 1
CARDIOVASCULAR NEGATIVE: 1
RESPIRATORY NEGATIVE: 1
NEUROLOGICAL NEGATIVE: 1
GASTROINTESTINAL NEGATIVE: 1

## 2023-03-22 NOTE — PROGRESS NOTES
Assessment & Plan     Chronic pain of both knees and bilateral hip pain  At this time, I am concerned the possibility of some degenerative changes within the knee and hip joint.  I have a low suspicion for psoriatic arthritis given that he has not had any issues with any persistent rashes.  After much discussion, patient was agreeable to proceed with x-ray imaging of his hips and knees as the initial step in his evaluation.  Images are currently pending.  We did discuss use of anti-inflammatories for management of his discomfort when it is present.  Patient will be contacted with results of the x-rays once they are available for review.  - XR Knee Bilateral 1/2 Views; Future  - XR Pelvis and Hip Bilateral 2 Views; Future    Ordering of each unique test  30 minutes spent on the date of the encounter doing chart review, history and exam, documentation and further activities per the note       Nicotine/Tobacco Cessation:  He reports that he has been smoking cigarettes. He has a 3.00 pack-year smoking history. He has never used smokeless tobacco.  Nicotine/Tobacco Cessation Plan:   Information offered: Patient not interested at this time      See Patient Instructions    Aldair Grider MD  [facility] is a 37 year [facility], presenting for the following health issues:  Musculoskeletal Problem  No flowsheet data found.  Patient is a 37-year-old  male who presents to the clinic with chief complaint of leg pain.  He reports that for the last several years he has had episodes of intermittent pain involving his bilateral hips and knees.  Patient has noted that when temperatures will get really cold he will develop severe pain and swelling in his knee and hip joints.  Pain is exacerbated by movement when present.  He does get some relief with sitting still.  Patient reports that the pain can be quite debilitating as it does make it very difficult for him to walk when it  "is present.  Patient denies any prior injury to his hips or knees.  This pain only occurs with a temperature gets very cold.  He is not aware of any family history of arthritis, but he does report a family history of psoriatic arthritis.  Patient has not noted any rashes since these episodes have been occurring.  He typically does not take any medication when his pain is present.    History of Present Illness       Reason for visit:  Joint pain in knees and issue with finger  Symptom onset:  More than a month  Symptoms include:  Pain and swelling in joints  Symptom intensity:  Moderate  Symptom progression:  Staying the same  Had these symptoms before:  No  What makes it worse:  Changes in weather fronts, cold is worse  What makes it better:  Nothing    He eats 0-1 servings of fruits and vegetables daily.He consumes 5 sweetened beverage(s) daily.He exercises with enough effort to increase his heart rate 30 to 60 minutes per day.  He exercises with enough effort to increase his heart rate 5 days per week.   He is taking medications regularly.         Review of Systems   Constitutional: Negative.    HENT: Negative.    Respiratory: Negative.    Cardiovascular: Negative.    Gastrointestinal: Negative.    Genitourinary: Negative.    Musculoskeletal: Positive for arthralgias and joint swelling.   Skin: Negative.    Neurological: Negative.           Objective    /76   Pulse 74   Temp 97.6  F (36.4  C)   Resp 18   Ht 1.88 m (6' 2\")   Wt 69.9 kg (154 lb)   SpO2 99%   BMI 19.77 kg/m    Body mass index is 19.77 kg/m .  Physical Exam  Vitals reviewed.   Constitutional:       Appearance: Normal appearance.   HENT:      Head: Normocephalic and atraumatic.      Mouth/Throat:      Mouth: Mucous membranes are moist.      Pharynx: Oropharynx is clear.   Eyes:      Extraocular Movements: Extraocular movements intact.      Conjunctiva/sclera: Conjunctivae normal.      Pupils: Pupils are equal, round, and reactive to light. "   Cardiovascular:      Rate and Rhythm: Normal rate and regular rhythm.      Pulses: Normal pulses.      Heart sounds: Normal heart sounds.   Pulmonary:      Effort: Pulmonary effort is normal.      Breath sounds: Normal breath sounds.   Musculoskeletal:      Right hip: Tenderness present. No crepitus. Decreased range of motion.      Left hip: Tenderness present. No crepitus. Decreased range of motion.      Right knee: No swelling, erythema or crepitus. Tenderness present over the medial joint line. Abnormal alignment.      Instability Tests: Anterior drawer test negative. Posterior drawer test negative.      Left knee: No swelling, erythema or crepitus. Tenderness present over the medial joint line. Normal alignment.      Instability Tests: Anterior drawer test negative. Posterior drawer test negative.      Comments: Tenderness when palpating over greater trochanteric process bilaterally.  Patient does have pain associated with internal and external rotation of both hips.   Skin:     General: Skin is warm and dry.      Capillary Refill: Capillary refill takes less than 2 seconds.   Neurological:      Mental Status: He is alert.        Diagnostic Test Results: X-rays of bilateral hips and knees are pending.

## 2024-03-16 ENCOUNTER — HEALTH MAINTENANCE LETTER (OUTPATIENT)
Age: 39
End: 2024-03-16

## 2024-06-05 ENCOUNTER — OFFICE VISIT (OUTPATIENT)
Dept: FAMILY MEDICINE | Facility: CLINIC | Age: 39
End: 2024-06-05
Payer: COMMERCIAL

## 2024-06-05 VITALS
HEIGHT: 75 IN | DIASTOLIC BLOOD PRESSURE: 68 MMHG | SYSTOLIC BLOOD PRESSURE: 105 MMHG | RESPIRATION RATE: 18 BRPM | HEART RATE: 96 BPM | TEMPERATURE: 97.6 F | OXYGEN SATURATION: 97 % | WEIGHT: 168.4 LBS | BODY MASS INDEX: 20.94 KG/M2

## 2024-06-05 DIAGNOSIS — L98.9 SKIN LESION: Primary | ICD-10-CM

## 2024-06-05 PROBLEM — F17.200 CURRENT EVERY DAY SMOKER: Status: ACTIVE | Noted: 2024-02-19

## 2024-06-05 PROCEDURE — 99213 OFFICE O/P EST LOW 20 MIN: CPT | Performed by: PHYSICIAN ASSISTANT

## 2024-06-05 ASSESSMENT — PAIN SCALES - GENERAL: PAINLEVEL: NO PAIN (0)

## 2024-06-05 NOTE — PROGRESS NOTES
"  Assessment & Plan     Skin lesion  Concerns with a lesion above right clavicle that has been present as long as he can remember. Has been concerning him more recently as he thinks it has gotten a little larger and a little darker. Wife has not noticed changes. He is here today mostly requesting referral to dermatology as he is pretty worried about lesion. Consider dermatofibroma vs other. Will refer to dermatology for evaluation.  - Adult Dermatology  Referral; Future          Subjective   Alf is a 38 year old, presenting for the following health issues:  Mole (Right side above clavicle)        6/5/2024     9:46 AM   Additional Questions   Roomed by Deb BARRAGAN MA   Accompanied by wife         6/5/2024     9:46 AM   Patient Reported Additional Medications   Patient reports taking the following new medications none     History of Present Illness       Reason for visit:  Mole check    He eats 2-3 servings of fruits and vegetables daily.He consumes 6 sweetened beverage(s) daily.He exercises with enough effort to increase his heart rate 60 or more minutes per day.  He exercises with enough effort to increase his heart rate 5 days per week.   He is taking medications regularly.      Concern - mole   Onset: forever   Description: right side above clavicle   Intensity: mild  Progression of Symptoms:  same  Accompanying Signs & Symptoms: none  Previous history of similar problem: no similar moles  Precipitating factors:        Worsened by: none  Alleviating factors:        Improved by: none  Therapies tried and outcome: None    He has had mole above right clavicle \"forever\"  Worries that it might be changing - maybe a little larger and darker in color  He is here today mostly requesting referral to dermatology  Partner has not noticed any changes in mole    No family history of skin cancer        Objective    /68 (BP Location: Right arm, Patient Position: Sitting, Cuff Size: Adult Large)   Pulse 96   Temp 97.6 " " F (36.4  C) (Oral)   Resp 18   Ht 1.905 m (6' 3\")   Wt 76.4 kg (168 lb 6.4 oz)   SpO2 97%   BMI 21.05 kg/m    Body mass index is 21.05 kg/m .  Physical Exam   GENERAL: alert and no distress  RESP: lungs clear to auscultation - no rales, rhonchi or wheezes  CV: regular rate and rhythm  SKIN: Round hyperpigmented firm papule superior to right mid clavicle.         Signed Electronically by: Yamileth Santacruz PA-C    "

## 2025-03-22 ENCOUNTER — HEALTH MAINTENANCE LETTER (OUTPATIENT)
Age: 40
End: 2025-03-22

## 2025-07-17 ENCOUNTER — OFFICE VISIT (OUTPATIENT)
Dept: DERMATOLOGY | Facility: CLINIC | Age: 40
End: 2025-07-17
Payer: COMMERCIAL

## 2025-07-17 DIAGNOSIS — L82.1 SEBORRHEIC KERATOSIS: Primary | ICD-10-CM

## 2025-07-17 NOTE — PROGRESS NOTES
[facility] Dermatology Note  Encounter Date: Jul 17, 2025  Office Visit     Reviewed patients past medical history and pertinent chart review prior to patients visit today.     Dermatology Problem List:    Personal Hx: Negative for personal history of skin cancer.   Family Hx: Negative for family history of skin cancer.  ____________________________________________    Assessment & Plan:     # Seborrheic keratosis, right supraclavicular area  - Benign, no further treatment needed. Continue with observation at this time. If lesions become bothersome, patient return to clinic for reevaluation. Brochure provided to the patient today.    Follow up: prn for new or changing lesions or new concerns    All risks, benefits and alternatives were discussed with patient.  Patient is in agreement and understands the assessment and plan.  All questions were answered.  Bushra Andrews PA-C  [facility]  _______________________________________    CC: Derm Problem (- mole on right shoulder, present for years, patient states it feels different and slightly harder and may have grown)     HPI:  Mr. Alf Dugan is a(n) 40 year old male who presents today as a new patient for evaluation of a skin lesion involving the right supraclavicular area. He is accompanied by his wife. This lesion has been present for numerous years. It does not itch, bleed, or cause pain. It has maybe grown, but he states this is subtle if it has. He notices a texture to the lesion.    He has no personal or family history of skin cancer.    Patient is otherwise feeling well, without additional skin concerns. Patient is diligent with photoprotection which he describes as sun avoidance.    Physical Exam:  SKIN: Focused examination of right supraclavicular area only was performed per patient request.  - right supraclavicular area, gray brown waxy dome shaped stuck on papule with reassuring features  - No other lesions of concern  on areas examined.     Medications:  Current Outpatient Medications   Medication Sig Dispense Refill    Melatonin 10 MG TABS tablet Take 10 mg by mouth nightly as needed for sleep       No current facility-administered medications for this visit.      Past Medical History:   Patient Active Problem List   Diagnosis    Current every day smoker     Past Medical History:   Diagnosis Date    Lung injury     Mild intermittent asthma     sports induced in past    Other chronic allergic conjunctivitis     Allergic conjunctivitis to cats       LEMUEL Santacruz PA-C  [address] [place] [address] on close of this encounter.

## 2025-07-17 NOTE — LETTER
7/17/2025      [place]  [address] [address] [address]      Dear Colleague,    Thank you for referring your patient, Alf Dugan, to the [facility]. Please see a copy of my visit note below.    [facility] Dermatology Note  Encounter Date: Jul 17, 2025  Office Visit     Reviewed patients past medical history and pertinent chart review prior to patients visit today.     Dermatology Problem List:    Personal Hx: Negative for personal history of skin cancer.   Family Hx: Negative for family history of skin cancer.  ____________________________________________    Assessment & Plan:     # Seborrheic keratosis, right supraclavicular area  - Benign, no further treatment needed. Continue with observation at this time. If lesions become bothersome, patient return to clinic for reevaluation. Brochure provided to the patient today.    Follow up: prn for new or changing lesions or new concerns    All risks, benefits and alternatives were discussed with patient.  Patient is in agreement and understands the assessment and plan.  All questions were answered.  Bushra Andrews PA-C  [facility]  _______________________________________    CC: Derm Problem (- mole on right shoulder, present for years, patient states it feels different and slightly harder and may have grown)     HPI:  Mr. Alf Dugan is a(n) 40 year old male who presents today as a new patient for evaluation of a skin lesion involving the right supraclavicular area. He is accompanied by his wife. This lesion has been present for numerous years. It does not itch, bleed, or cause pain. It has maybe grown, but he states this is subtle if it has. He notices a texture to the lesion.    He has no personal or family history of skin cancer.    Patient is otherwise feeling well, without additional skin concerns. Patient is diligent with photoprotection which he describes as sun  avoidance.    Physical Exam:  SKIN: Focused examination of right supraclavicular area only was performed per patient request.  - right supraclavicular area, gray brown waxy dome shaped stuck on papule with reassuring features  - No other lesions of concern on areas examined.     Medications:  Current Outpatient Medications   Medication Sig Dispense Refill     Melatonin 10 MG TABS tablet Take 10 mg by mouth nightly as needed for sleep       No current facility-administered medications for this visit.      Past Medical History:   Patient Active Problem List   Diagnosis     Current every day smoker     Past Medical History:   Diagnosis Date     Lung injury      Mild intermittent asthma     sports induced in past     Other chronic allergic conjunctivitis     Allergic conjunctivitis to cats       CC Yamileth Santacruz PA-C  [address] [place] [address] on close of this encounter.     Again, thank you for allowing me to participate in the care of your patient.        Sincerely,        Lisette Andrews PA-C    Electronically signed